# Patient Record
Sex: MALE | Race: OTHER | HISPANIC OR LATINO | URBAN - METROPOLITAN AREA
[De-identification: names, ages, dates, MRNs, and addresses within clinical notes are randomized per-mention and may not be internally consistent; named-entity substitution may affect disease eponyms.]

---

## 2021-08-02 ENCOUNTER — EMERGENCY (EMERGENCY)
Facility: HOSPITAL | Age: 21
LOS: 0 days | Discharge: HOME | End: 2021-08-02
Attending: EMERGENCY MEDICINE | Admitting: EMERGENCY MEDICINE
Payer: MEDICAID

## 2021-08-02 VITALS
OXYGEN SATURATION: 100 % | HEART RATE: 115 BPM | TEMPERATURE: 98 F | DIASTOLIC BLOOD PRESSURE: 67 MMHG | RESPIRATION RATE: 17 BRPM | SYSTOLIC BLOOD PRESSURE: 150 MMHG
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
## 2021-08-02 DIAGNOSIS — S91.011A LACERATION WITHOUT FOREIGN BODY, RIGHT ANKLE, INITIAL ENCOUNTER: ICD-10-CM
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
## 2021-08-02 PROCEDURE — 12001 RPR S/N/AX/GEN/TRNK 2.5CM/<: CPT

## 2021-08-02 PROCEDURE — 99283 EMERGENCY DEPT VISIT LOW MDM: CPT | Mod: 25

## 2021-08-02 NOTE — ED PROVIDER NOTE - NSFOLLOWUPINSTRUCTIONS_ED_ALL_ED_FT
Laceration    A laceration is a cut that goes through all of the layers of the skin and into the tissue that is right under the skin. Some lacerations heal on their own. Others need to be closed with stitches (sutures), staples, skin adhesive strips, or skin glue. Proper laceration care minimizes the risk of infection and helps the laceration to heal better.     SEEK IMMEDIATE MEDICAL CARE IF YOU HAVE THE FOLLOWING SYMPTOMS: swelling around the wound, worsening pain, drainage from the wound, red streaking going away from your wound, inability to move finger or toe near the laceration, or discoloration of skin near the laceration.     Removal of sutures in 7 - 10 days

## 2021-08-02 NOTE — ED PROVIDER NOTE - PHYSICAL EXAMINATION
GEN: Patient in no acute distress  MS: Normal ROM in all extremities. No midline spinal tenderness. pulses 2 +. no calf tenderness or swelling.  SKIN: 2 cm laceration to right anterior ankle. Warm, dry, no acute rashes. Good turgor  NEURO: Strength 5/5 with no sensory deficits. Steady gait.

## 2021-08-02 NOTE — ED PROVIDER NOTE - ATTENDING CONTRIBUTION TO CARE
20 y/o male no sig PMH p/w laceration to rt ankle sustained PTA. VSS, approx 2 cm linear lace to ant ankle. Wound was examined to its base in a bloodless field, in full range of motion, no foreign bodies were appreciated, no skin avulsion or devitalized tissue, no surrounding contusion or abrasion, no bone or joint violation, no apparent tendon injury. No jewelry present. Closure included skin and subcutaneous tissue.    No old chart available for review.  I have reviewed and agree with the initial nursing note, except as documented in my note.    Patient tolerated the procedure well. Wound care instructions provided. Pt was instructed on signs and symptoms of wound infection, including pain, fever, redness, swelling, lymphangitis, sensory/motor deficits, and instructed for Pt to return to PCP or ED immediately should these symptoms present. Pt verbally expressed understanding and all questions were addressed to Pt's satisfaction.

## 2021-08-02 NOTE — ED PROVIDER NOTE - PATIENT PORTAL LINK FT
You can access the FollowMyHealth Patient Portal offered by [facility] by registering at the following website: [email or web address]. By joining [employer]’s FollowMyHealth portal, you will also be able to view your health information using other applications (apps) compatible with our system.

## 2021-08-02 NOTE — ED PROVIDER NOTE - OBJECTIVE STATEMENT
21 year old male with no pmhx presents with laceration to right ankle. pt denies bleeding or swelling.

## 2022-07-07 ENCOUNTER — EMERGENCY (EMERGENCY)
Facility: HOSPITAL | Age: 22
LOS: 0 days | Discharge: HOME | End: 2022-07-08
Attending: EMERGENCY MEDICINE | Admitting: EMERGENCY MEDICINE

## 2022-07-07 DIAGNOSIS — S91.012A LACERATION WITHOUT FOREIGN BODY, LEFT ANKLE, INITIAL ENCOUNTER: ICD-10-CM
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
## 2022-07-07 DIAGNOSIS — Z23 ENCOUNTER FOR IMMUNIZATION: ICD-10-CM

## 2022-07-07 DIAGNOSIS — S81.812A LACERATION WITHOUT FOREIGN BODY, LEFT LOWER LEG, INITIAL ENCOUNTER: ICD-10-CM
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
## 2022-07-07 PROCEDURE — 12001 RPR S/N/AX/GEN/TRNK 2.5CM/<: CPT

## 2022-07-07 PROCEDURE — 99283 EMERGENCY DEPT VISIT LOW MDM: CPT | Mod: 25

## 2022-07-08 VITALS
WEIGHT: 139.99 LBS | RESPIRATION RATE: 18 BRPM | HEIGHT: 66 IN | SYSTOLIC BLOOD PRESSURE: 117 MMHG | TEMPERATURE: 98 F | OXYGEN SATURATION: 100 % | HEART RATE: 73 BPM | DIASTOLIC BLOOD PRESSURE: 68 MMHG

## 2022-07-08 RX ORDER — TETANUS TOXOID, REDUCED DIPHTHERIA TOXOID AND ACELLULAR PERTUSSIS VACCINE, ADSORBED 5; 2.5; 8; 8; 2.5 [IU]/.5ML; [IU]/.5ML; UG/.5ML; UG/.5ML; UG/.5ML
0.5 SUSPENSION INTRAMUSCULAR ONCE
Refills: 0 | Status: COMPLETED | OUTPATIENT
Start: 2022-07-08 | End: 2022-07-08

## 2022-07-08 RX ORDER — LIDOCAINE HCL 20 MG/ML
5 VIAL (ML) INJECTION ONCE
Refills: 0 | Status: COMPLETED | OUTPATIENT
Start: 2022-07-08 | End: 2022-07-08

## 2022-07-08 RX ADMIN — TETANUS TOXOID, REDUCED DIPHTHERIA TOXOID AND ACELLULAR PERTUSSIS VACCINE, ADSORBED 0.5 MILLILITER(S): 5; 2.5; 8; 8; 2.5 SUSPENSION INTRAMUSCULAR at 02:42

## 2022-07-08 RX ADMIN — Medication 5 MILLILITER(S): at 02:45

## 2022-07-08 NOTE — ED ADULT NURSE NOTE - NS ED NURSE LEVEL OF CONSCIOUSNESS ORIENTATION
LM on VM
States she will call us back to schedule an appointment
Oriented - self; Oriented - place; Oriented - time

## 2022-07-08 NOTE — ED PROVIDER NOTE - NSFOLLOWUPINSTRUCTIONS_ED_ALL_ED_FT
Laceration Care, Adult    KEEP DRESSING CLEAN AND DRY X 2 DAYS THEN REMOVE DRESSING, CLEAN WITH SOAP / WATER, DRY AND APPLY BACITRACIN 2X/DAY.   YOU CAN LEAVE IT UNCOVERED AFTER 2 DAYS;   SUTURES TO BE REMOVED IN 7-10 DAYS    ImageA laceration is a cut that goes through all of the layers of the skin and into the tissue that is right under the skin. Some lacerations heal on their own. Others need to be closed with stitches (sutures), staples, skin adhesive strips, or skin glue. Proper laceration care minimizes the risk of infection and helps the laceration to heal better.    How is this treated?  If sutures or staples were used:     Keep the wound clean and dry.  If you were given a bandage (dressing), you should change it at least one time per day or as told by your health care provider. You should also change it if it becomes wet or dirty.  Keep the wound completely dry for the first 24 hours or as told by your health care provider. After that time, you may shower or bathe. However, make sure that the wound is not soaked in water until after the sutures or staples have been removed.  Clean the wound one time each day or as told by your health care provider:    Wash the wound with soap and water.  Rinse the wound with water to remove all soap.  Pat the wound dry with a clean towel. Do not rub the wound.    After cleaning the wound, apply a thin layer of antibiotic ointment as told by your health care provider. This will help to prevent infection and keep the dressing from sticking to the wound.  Have the sutures or staples removed as told by your health care provider.  If skin adhesive strips were used:     Keep the wound clean and dry.  If you were given a bandage (dressing), you should change it at least one time per day or as told by your health care provider. You should also change it if it becomes dirty or wet.  Do not get the skin adhesive strips wet. You may shower or bathe, but be careful to keep the wound dry.  If the wound gets wet, pat it dry with a clean towel. Do not rub the wound.  Skin adhesive strips fall off on their own. You may trim the strips as the wound heals. Do not remove skin adhesive strips that are still stuck to the wound. They will fall off in time.  If skin glue was used:     Try to keep the wound dry, but you may briefly wet it in the shower or bath. Do not soak the wound in water, such as by swimming.  After you have showered or bathed, gently pat the wound dry with a clean towel. Do not rub the wound.  Do not do any activities that will make you sweat heavily until the skin glue has fallen off on its own.  Do not apply liquid, cream, or ointment medicine to the wound while the skin glue is in place. Using those may loosen the film before the wound has healed.  If you were given a bandage (dressing), you should change it at least one time per day or as told by your health care provider. You should also change it if it becomes dirty or wet.  If a dressing is placed over the wound, be careful not to apply tape directly over the skin glue. Doing that may cause the glue to be pulled off before the wound has healed.  Do not pick at the glue. The skin glue usually remains in place for 5–10 days, then it falls off of the skin.  General Instructions     Take over-the-counter and prescription medicines only as told by your health care provider.  If you were prescribed an antibiotic medicine or ointment, take or apply it as told by your doctor. Do not stop using it even if your condition improves.  To help prevent scarring, make sure to cover your wound with sunscreen whenever you are outside after stitches are removed, after adhesive strips are removed, or when glue remains in place and the wound is healed. Make sure to wear a sunscreen of at least 30 SPF.  Do not scratch or pick at the wound.  Keep all follow-up visits as told by your health care provider. This is important.  Check your wound every day for signs of infection. Watch for:    Redness, swelling, or pain.  Fluid, blood, or pus.    Raise (elevate) the injured area above the level of your heart while you are sitting or lying down, if possible.  Contact a health care provider if:  You received a tetanus shot and you have swelling, severe pain, redness, or bleeding at the injection site.  You have a fever.  A wound that was closed breaks open.  You notice a bad smell coming from your wound or your dressing.  You notice something coming out of the wound, such as wood or glass.  Your pain is not controlled with medicine.  You have increased redness, swelling, or pain at the site of your wound.  You have fluid, blood, or pus coming from your wound.  You notice a change in the color of your skin near your wound.  You need to change the dressing frequently due to fluid, blood, or pus draining from the wound.  You develop a new rash.  You develop numbness around the wound.  Get help right away if:  You develop severe swelling around the wound.  Your pain suddenly increases and is severe.  You develop painful lumps near the wound or on skin that is anywhere on your body.  You have a red streak going away from your wound.  The wound is on your hand or foot and you cannot properly move a finger or toe.  The wound is on your hand or foot and you notice that your fingers or toes look pale or bluish.  This information is not intended to replace advice given to you by your health care provider. Make sure you discuss any questions you have with your health care provider.

## 2022-07-08 NOTE — ED PROVIDER NOTE - NS ED ATTENDING STATEMENT MOD
This was a shared visit with the ABDIRAHMAN. I reviewed and verified the documentation and independently performed the documented:

## 2022-07-08 NOTE — ED PROVIDER NOTE - CLINICAL SUMMARY MEDICAL DECISION MAKING FREE TEXT BOX
22-year-old male presented ED for laceration to lower extremity.  Patient had tetanus updated laceration of.  DC home.

## 2022-07-08 NOTE — ED PROVIDER NOTE - PATIENT PORTAL LINK FT
You can access the FollowMyHealth Patient Portal offered by [facility] by registering at the following website: [email or web address]. By joining [employer]’s FollowMyHealth portal, you will also be able to view your health information using other applications (apps) compatible with our system.

## 2022-07-08 NOTE — ED PROVIDER NOTE - NSFOLLOWUPCLINICS_GEN_ALL_ED_FT
[facility]  Surgery  [address] [place] [address]  Phone: [phone]  Fax:   Follow Up Time: 7-10 Days

## 2022-07-08 NOTE — ED PROVIDER NOTE - ATTENDING APP SHARED VISIT CONTRIBUTION OF CARE
22-year-old male presents to ED for laceration to his left lower extremity.  Patient was cleaning out his hamster cage when the metal part slid and hit his shin.  No other injuries.  Patient does not member when his last tetanus shot was.    Const: NAD  Eyes: PERRL, no conjunctival injection  HENT:  Neck supple without meningismus   CV: RRR, Warm, well-perfused extremities  RESP: CTA B/L, no tachypnea   MSK: No gross deformities appreciated  Skin: 2 cm laceration to distal L shin  Neuro: Alert, CNs II-XII grossly intact. Sensation and motor function of extremities grossly intact.  Psych: Appropriate mood and affect.    Update tetanus laceration repair

## 2022-07-08 NOTE — ED PROVIDER NOTE - OBJECTIVE STATEMENT
22 yold male to Ed s/p laceration to left ankle after he dropped metal rack on to ankle; 2 cm circular lac; td >10 yrs;

## 2022-07-08 NOTE — ED ADULT NURSE NOTE - OBJECTIVE STATEMENT
Pt is here for left lower leg laceration with mild bleeding. Pt described he got the cut from the hamster's cage. Pt denies on blood thinners. Dressing changed by RN. Will continue to monitor pt.

## 2022-07-08 NOTE — ED PROVIDER NOTE - NS ED ROS FT
Constitutional: (-) fever  Cardiovascular: (-) syncope  Integumentary: (-) rash  Musculoskeltal: left ankle lac  Neurological: (-) altered mental status

## 2023-01-27 ENCOUNTER — EMERGENCY (EMERGENCY)
Facility: HOSPITAL | Age: 23
LOS: 0 days | Discharge: HOME | End: 2023-01-27
Attending: STUDENT IN AN ORGANIZED HEALTH CARE EDUCATION/TRAINING PROGRAM
Payer: MEDICAID

## 2023-01-27 VITALS
DIASTOLIC BLOOD PRESSURE: 55 MMHG | HEART RATE: 81 BPM | OXYGEN SATURATION: 100 % | RESPIRATION RATE: 16 BRPM | TEMPERATURE: 99 F | SYSTOLIC BLOOD PRESSURE: 107 MMHG

## 2023-01-27 DIAGNOSIS — Z20.822 CONTACT WITH AND (SUSPECTED) EXPOSURE TO COVID-19: ICD-10-CM

## 2023-01-27 DIAGNOSIS — J02.9 ACUTE PHARYNGITIS, UNSPECIFIED: ICD-10-CM

## 2023-01-27 LAB
FLUAV AG NPH QL: SIGNIFICANT CHANGE UP
FLUBV AG NPH QL: SIGNIFICANT CHANGE UP
RSV RNA NPH QL NAA+NON-PROBE: SIGNIFICANT CHANGE UP
SARS-COV-2 RNA SPEC QL NAA+PROBE: SIGNIFICANT CHANGE UP

## 2023-01-27 PROCEDURE — 99284 EMERGENCY DEPT VISIT MOD MDM: CPT

## 2023-01-27 RX ORDER — DEXAMETHASONE 0.5 MG/5ML
10 ELIXIR ORAL ONCE
Refills: 0 | Status: COMPLETED | OUTPATIENT
Start: 2023-01-27 | End: 2023-01-27

## 2023-01-27 RX ORDER — KETOROLAC TROMETHAMINE 30 MG/ML
15 SYRINGE (ML) INJECTION ONCE
Refills: 0 | Status: DISCONTINUED | OUTPATIENT
Start: 2023-01-27 | End: 2023-01-27

## 2023-01-27 RX ADMIN — Medication 10 MILLIGRAM(S): at 14:17

## 2023-01-27 RX ADMIN — Medication 15 MILLIGRAM(S): at 14:17

## 2023-01-27 NOTE — ED PROVIDER NOTE - NS ED ROS FT
No fever, nausea, vomiting, chest pain, sob, cough, headache, numbness/tingling, abdominal pain, diarrhea, constipation, myalgias, rash.

## 2023-01-27 NOTE — ED PROVIDER NOTE - NSFOLLOWUPCLINICS_GEN_ALL_ED_FT
[facility]  Medicine  [address], [place]   Phone: [phone]  Fax:   Follow Up Time: Routine

## 2023-01-27 NOTE — ED PROVIDER NOTE - CLINICAL SUMMARY MEDICAL DECISION MAKING FREE TEXT BOX
22-year-old male presented with sore throat.  Patient given medications in the emergency department.  No signs of exudates or bacterial throat infection.  Patient able to tolerate p.o. at this time.  Patient instructed follow-up primary care doctor. Patient to be discharged from ED. Any available test results were discussed with patient and/or family. Verbal instructions given, including instructions to return to ED immediately for any new, worsening, or concerning symptoms. Patient endorsed understanding. Written discharge instructions additionally given, including follow-up plan.

## 2023-01-27 NOTE — ED PROVIDER NOTE - PHYSICAL EXAMINATION
CONSTITUTIONAL: NAD.   SKIN: warm, dry  HEAD: Normocephalic; atraumatic.  EYES: no conjunctival injection.   ENT: MMM. mild pharyngeal erythema, no exudates.   NECK: Supple.  CARD: RRR.   RESP: No wheezes, rales or rhonchi.  ABD: soft ntnd.   EXT: Normal ROM.  No lower extremity edema.   LYMPH: No acute cervical adenopathy.  NEURO: Alert, oriented, grossly unremarkable. No FND.   PSYCH: Cooperative, appropriate.

## 2023-01-27 NOTE — ED PROVIDER NOTE - OBJECTIVE STATEMENT
22-year-old male no reported past medical history presented to the emergency department with sore throat for 3 days.  Patient denies any sick contacts.  Denies any cough congestion or fever.  Patient reports that he found 1 amoxicillin pill and took it without relief.  Patient denies vomiting and is able to eat food at home.  Patient reports that he lost his voice.

## 2023-01-27 NOTE — ED ADULT NURSE NOTE - NSIMPLEMENTINTERV_GEN_ALL_ED
Implemented All Universal Safety Interventions:  [place] to call system. Call bell, personal items and telephone within reach. Instruct patient to call for assistance. Room bathroom lighting operational. Non-slip footwear when patient is off stretcher. Physically safe environment: no spills, clutter or unnecessary equipment. Stretcher in lowest position, wheels locked, appropriate side rails in place.

## 2023-01-27 NOTE — ED ADULT NURSE NOTE - CHIEF COMPLAINT QUOTE
pt with c/o sore throat since monday , c/o feeling fatigued with diff speaking. pt put on antibiotics yesterday.

## 2023-01-27 NOTE — ED PROVIDER NOTE - PATIENT PORTAL LINK FT
You can access the FollowMyHealth Patient Portal offered by [facility] by registering at the following website: [email or web address]. By joining [employer]’s FollowMyHealth portal, you will also be able to view your health information using other applications (apps) compatible with our system.

## 2024-03-07 NOTE — ED PROVIDER NOTE - PHYSICAL EXAMINATION
Problem: Patient Centered Care  Goal: Patient preferences are identified and integrated in the patient's plan of care  Description: Interventions:  - What would you like us to know as we care for you? I want to go home.  - Provide timely, complete, and accurate information to patient/family  - Incorporate patient and family knowledge, values, beliefs, and cultural backgrounds into the planning and delivery of care  - Encourage patient/family to participate in care and decision-making at the level they choose  - Honor patient and family perspectives and choices  Outcome: Adequate for Discharge     Problem: Diabetes/Glucose Control  Goal: Glucose maintained within prescribed range  Description: INTERVENTIONS:  - Monitor Blood Glucose as ordered  - Assess for signs and symptoms of hyperglycemia and hypoglycemia  - Administer ordered medications to maintain glucose within target range  - Assess barriers to adequate nutritional intake and initiate nutrition consult as needed  - Instruct patient on self management of diabetes  Outcome: Adequate for Discharge     Problem: Patient/Family Goals  Goal: Patient/Family Long Term Goal  Description: Patient's Long Term Goal: Go home    Interventions:  - Educate on medical regimen  - Patient able to tolerate diet  - Incision free from infection  - complete IV abx  - Manage pain and nausea  - See additional Care Plan goals for specific interventions  Outcome: Adequate for Discharge  Goal: Patient/Family Short Term Goal  Description: Patient's Short Term Goal: No pain    Interventions:   - CT abdomen  - Surgery consult  - Pain meds prn  - See additional Care Plan goals for specific interventions  Outcome: Adequate for Discharge     Problem: GASTROINTESTINAL - ADULT  Goal: Minimal or absence of nausea and vomiting  Description: INTERVENTIONS:  - Maintain adequate hydration with IV or PO as ordered and tolerated  - Nasogastric tube to low intermittent suction as ordered  - Evaluate  effectiveness of ordered antiemetic medications  - Provide nonpharmacologic comfort measures as appropriate  - Advance diet as tolerated, if ordered  - Obtain nutritional consult as needed  - Evaluate fluid balance  Outcome: Adequate for Discharge  Goal: Maintains or returns to baseline bowel function  Description: INTERVENTIONS:  - Assess bowel function  - Maintain adequate hydration with IV or PO as ordered and tolerated  - Evaluate effectiveness of GI medications  - Encourage mobilization and activity  - Obtain nutritional consult as needed  - Establish a toileting routine/schedule  - Consider collaborating with pharmacy to review patient's medication profile  Outcome: Adequate for Discharge     Problem: PAIN - ADULT  Goal: Verbalizes/displays adequate comfort level or patient's stated pain goal  Description: INTERVENTIONS:  - Encourage pt to monitor pain and request assistance  - Assess pain using appropriate pain scale  - Administer analgesics based on type and severity of pain and evaluate response  - Implement non-pharmacological measures as appropriate and evaluate response  - Consider cultural and social influences on pain and pain management  - Manage/alleviate anxiety  - Utilize distraction and/or relaxation techniques  - Monitor for opioid side effects  - Notify MD/LIP if interventions unsuccessful or patient reports new pain  - Anticipate increased pain with activity and pre-medicate as appropriate  Outcome: Adequate for Discharge     Problem: RISK FOR INFECTION - ADULT  Goal: Absence of fever/infection during anticipated neutropenic period  Description: INTERVENTIONS  - Monitor WBC  - Administer growth factors as ordered  - Implement neutropenic guidelines  Outcome: Adequate for Discharge     Problem: SAFETY ADULT - FALL  Goal: Free from fall injury  Description: INTERVENTIONS:  - Assess pt frequently for physical needs  - Identify cognitive and physical deficits and behaviors that affect risk of  falls.  - [place] fall precautions as indicated by assessment.  - Educate pt/family on patient safety including physical limitations  - Instruct pt to call for assistance with activity based on assessment  - Modify environment to reduce risk of injury  - Provide assistive devices as appropriate  - Consider OT/PT consult to assist with strengthening/mobility  - Encourage toileting schedule  Outcome: Adequate for Discharge   Pain controlled with Toradol. Tolerated general diet well, no pain and no nausea or vomiting. Vital signs stable. Saline lock discontinued. Discharge instructions given and discussed with patient and wife. Instructions includes medications to take at home and when to take the next dose. Wife aware of the medications that needed to be picked up at their pharmacy. Also discussed about setting up follow up appointment with primary care physician and with Dr. Vasquez (surgeon) Wife and patient verbalized understanding. Patient discharged home with wife, patient in stable condition.   Vital Signs: I have reviewed the initial vital signs.  Constitutional: well-nourished, appears stated age, no acute distress  Left ankle; +2 cm circular cresent shaped distal based flap lac;   flex/ext at ankle intact; pulses and sensation intact;

## 2024-03-29 ENCOUNTER — EMERGENCY (EMERGENCY)
Facility: HOSPITAL | Age: 24
LOS: 0 days | Discharge: HOME | End: 2024-03-29
Attending: EMERGENCY MEDICINE
Payer: COMMERCIAL

## 2024-03-29 VITALS
TEMPERATURE: 97 F | DIASTOLIC BLOOD PRESSURE: 72 MMHG | SYSTOLIC BLOOD PRESSURE: 126 MMHG | HEART RATE: 83 BPM | OXYGEN SATURATION: 100 % | RESPIRATION RATE: 14 BRPM | WEIGHT: 139.99 LBS

## 2024-03-29 DIAGNOSIS — M79.641 PAIN IN RIGHT HAND: ICD-10-CM
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
## 2024-03-29 PROCEDURE — 99284 EMERGENCY DEPT VISIT MOD MDM: CPT

## 2024-03-29 PROCEDURE — 73110 X-RAY EXAM OF WRIST: CPT | Mod: 26,RT

## 2024-03-29 PROCEDURE — 99284 EMERGENCY DEPT VISIT MOD MDM: CPT | Mod: 25

## 2024-03-29 PROCEDURE — 73130 X-RAY EXAM OF HAND: CPT | Mod: 26,RT

## 2024-03-29 PROCEDURE — 73130 X-RAY EXAM OF HAND: CPT | Mod: RT

## 2024-03-29 PROCEDURE — 73110 X-RAY EXAM OF WRIST: CPT | Mod: RT

## 2024-03-29 NOTE — ED PROVIDER NOTE - CLINICAL SUMMARY MEDICAL DECISION MAKING FREE TEXT BOX
Right wrist pain which occurred after moving a conveyor belt and getting his wrist jammed.  Exam as above  Impression  Patient with right wrist pain x-rays no fracture discharge home.

## 2024-03-29 NOTE — ED PROVIDER NOTE - PHYSICAL EXAMINATION
Vital Signs: I have reviewed the initial vital signs.  Constitutional: appears stated age, no acute distress  Eyes: Sclera clear, EOMI.  Cardiovascular: S1 and S2, regular rate, regular rhythm, well-perfused extremities, radial pulses equal and 2+  Respiratory: unlabored respiratory effort  Musculoskeletal: supple neck, no lower extremity edema, full range of motion 5/5 strength and sensation intact to right wrist and right hand. No bony tenderness.  Integumentary: warm, dry, no rash  Neurologic: awake, alert, oriented x3, extremities’ motor and sensory functions grossly intact

## 2024-03-29 NOTE — ED PROVIDER NOTE - OBJECTIVE STATEMENT
23-year-old male denies any past medical history presents complaint of right upper extremity pain.  Reports he was moving a conveyor belt from [place] and jammed his right wrist and forearm against the wall of the truck.  Reports he had mild tenderness to the dorsal aspect of the right wrist which has since resolved.  Denies other injury/trauma.

## 2024-03-29 NOTE — ED PROVIDER NOTE - NSFOLLOWUPINSTRUCTIONS_ED_ALL_ED_FT
Follow-up with your PCP as needed.  Take 500mg acetaminophen every 6-8 hours for pain as needed.    Hand Pain  Many things can cause hand pain. Some common causes are:  An injury.  Repeating the same movement with your hand over and over (overuse).  Osteoporosis.  Arthritis.  Lumps in the tendons or joints of the hand and wrist (ganglion cysts).  Nerve compression syndromes (carpal tunnel syndrome).  Inflammation of the tendons (tendinitis).  Infection.  Follow these instructions at home:  Pay attention to any changes in your symptoms. Take these actions to help with your discomfort:    Managing pain, stiffness, and swelling    A bag of ice on a towel on the skin.  Take over-the-counter and prescription medicines only as told by your health care provider.  Wear a hand splint or support as told by your health care provider.  If directed, put ice on the affected area:  Put ice in a plastic bag.  Place a towel between your skin and the bag.  Leave the ice on for 20 minutes, 2–3 times a day.  Activity    Take breaks from repetitive activity often.  Avoid activities that make your pain worse.  Minimize stress on your hands and wrists as much as possible.  Do stretches or exercises as told by your health care provider.  Do not do activities that make your pain worse.  Contact a health care provider if:  Your pain does not get better after a few days of self-care.  Your pain gets worse.  Your pain affects your ability to do your daily activities.  Get help right away if:  Your hand becomes warm, red, or swollen.  Your hand is numb or tingling.  Your hand is extremely swollen or deformed.  Your hand or fingers turn white or blue.  You cannot move your hand, wrist, or fingers.  Summary  Many things can cause hand pain.  Contact your health care provider if your pain does not get better after a few days of self care.  Minimize stress on your hands and wrists as much as possible.  Do not do activities that make your pain worse.

## 2024-03-29 NOTE — ED ADULT TRIAGE NOTE - CHIEF COMPLAINT QUOTE
Pt reports he was moving a converter out of a trailer and the converter jammed  his wrist and forearm between the trailer wall. No obvious swelling, redness or deformities noted to right arm/wrist.

## 2024-03-29 NOTE — ED PROVIDER NOTE - PATIENT PORTAL LINK FT
You can access the FollowMyHealth Patient Portal offered by [facility] by registering at the following website: [email or web address]. By joining [employer]’s FollowMyHealth portal, you will also be able to view your health information using other applications (apps) compatible with our system.

## 2024-06-19 PROBLEM — Z78.9 OTHER SPECIFIED HEALTH STATUS: Chronic | Status: ACTIVE | Noted: 2022-07-08

## 2024-12-08 NOTE — ED ADULT TRIAGE NOTE - PATIENT ON (OXYGEN DELIVERY METHOD)
room air
Hx of HTN on Diovan 160mg, Metoprolol suc 25mg QD as per sure script  - c/w home med  with parameters.  - DASH diet  - MED REC

## 2025-05-12 NOTE — ED PROCEDURE NOTE - CPROC ED POST PROC CARE GUIDE1
Verbal/written post procedure instructions were given to patient/caregiver./Instructed patient/caregiver to follow-up with primary care physician./Instructed patient/caregiver regarding signs and symptoms of infection./Keep the cast/splint/dressing clean and dry.
no back pain/no chills/no fever